# Patient Record
Sex: FEMALE | Race: WHITE
[De-identification: names, ages, dates, MRNs, and addresses within clinical notes are randomized per-mention and may not be internally consistent; named-entity substitution may affect disease eponyms.]

---

## 2019-12-14 ENCOUNTER — HOSPITAL ENCOUNTER (EMERGENCY)
Dept: HOSPITAL 7 - FB.ED | Age: 19
Discharge: HOME | End: 2019-12-14
Payer: COMMERCIAL

## 2019-12-14 DIAGNOSIS — H10.31: Primary | ICD-10-CM

## 2019-12-14 NOTE — EDM.PDOC
ED HPI GENERAL MEDICAL PROBLEM





- General


Chief Complaint: Eye Problems


Stated Complaint: RT EYE PAIN


Time Seen by Provider: 12/14/19 14:55


Source of Information: Reports: Patient


History Limitations: Reports: No Limitations





- History of Present Illness


INITIAL COMMENTS - FREE TEXT/NARRATIVE: 





pt c/o right eye burning and itching sensation X 2 days, describe watery 

discharge no crusting, denies trouble with her vision or photosensitivity or 

any other associated sx or medical concerns. pt uses contact lenses and has 

been avoiding them the past 2 days.  





ED ROS GENERAL





- Review of Systems


Review Of Systems: See Below


Constitutional: Reports: No Symptoms


HEENT: Reports: Contact Lenses, Eye Discharge.  Denies: Vision Change


Respiratory: Reports: No Symptoms


Cardiovascular: Reports: No Symptoms





ED EXAM GENERAL W FULL EYE





- Physical Exam


Exam: See Below


Exam Limited By: No Limitations


General Appearance: Alert, No Apparent Distress


Eyelids: Bilateral: Normal Appearance


Conjunctiva & Sclera: Right: Injected, Left: Normal Appearance


Cornea Exam: Bilateral: Normal Appearance


Extraocular Movements: Bilateral: Intact


Pupils: Normal Accommodation


Pupillary Size: Bilateral: 3 mm


Pupillary Reaction: Bilateral: Brisk


Anterior Chamber: Bilateral: Normal Appearance


Nose: Normal Inspection


Throat/Mouth: Normal Inspection, Normal Oropharynx


Respiratory/Chest: No Respiratory Distress, Lungs Clear


Cardiovascular: Regular Rate, Rhythm





Course





- Vital Signs


Text/Narrative:: 





pt has acute conjunctivitis. gentamicin was prescribed , pt to avoid using 

contact lenses until this resolve and to follow with eye clinic in 2-3  days if 

no gradual improvement noticed.  





Departure





- Departure


Time of Disposition: 15:13


Disposition: Home, Self-Care 01


Clinical Impression: 


 Acute conjunctivitis








- Discharge Information


Referrals: 


PCP,None [Primary Care Provider] -

## 2023-10-05 ENCOUNTER — APPOINTMENT (OUTPATIENT)
Dept: CT IMAGING | Facility: CLINIC | Age: 23
End: 2023-10-05
Attending: SOCIAL WORKER
Payer: COMMERCIAL

## 2023-10-05 ENCOUNTER — HOSPITAL ENCOUNTER (EMERGENCY)
Facility: CLINIC | Age: 23
Discharge: HOME | End: 2023-10-05
Attending: SOCIAL WORKER | Admitting: SOCIAL WORKER
Payer: COMMERCIAL

## 2023-10-05 VITALS
OXYGEN SATURATION: 100 % | SYSTOLIC BLOOD PRESSURE: 105 MMHG | WEIGHT: 143 LBS | DIASTOLIC BLOOD PRESSURE: 60 MMHG | TEMPERATURE: 97.6 F | HEART RATE: 83 BPM | RESPIRATION RATE: 18 BRPM

## 2023-10-05 DIAGNOSIS — R20.0 LEFT ARM NUMBNESS: ICD-10-CM

## 2023-10-05 LAB
ANION GAP SERPL CALCULATED.3IONS-SCNC: 10 MMOL/L (ref 7–15)
ATRIAL RATE - MUSE: 87 BPM
BUN SERPL-MCNC: 12.6 MG/DL (ref 6–20)
CALCIUM SERPL-MCNC: 9.2 MG/DL (ref 8.6–10)
CHLORIDE SERPL-SCNC: 103 MMOL/L (ref 98–107)
CREAT SERPL-MCNC: 0.73 MG/DL (ref 0.51–0.95)
DEPRECATED HCO3 PLAS-SCNC: 28 MMOL/L (ref 22–29)
DIASTOLIC BLOOD PRESSURE - MUSE: NORMAL MMHG
EGFRCR SERPLBLD CKD-EPI 2021: >90 ML/MIN/1.73M2
ERYTHROCYTE [DISTWIDTH] IN BLOOD BY AUTOMATED COUNT: 12.2 % (ref 10–15)
GLUCOSE SERPL-MCNC: 96 MG/DL (ref 70–99)
HCT VFR BLD AUTO: 42.7 % (ref 35–47)
HGB BLD-MCNC: 13.6 G/DL (ref 11.7–15.7)
HOLD SPECIMEN: NORMAL
HOLD SPECIMEN: NORMAL
INTERPRETATION ECG - MUSE: NORMAL
MCH RBC QN AUTO: 30.1 PG (ref 26.5–33)
MCHC RBC AUTO-ENTMCNC: 31.9 G/DL (ref 31.5–36.5)
MCV RBC AUTO: 95 FL (ref 78–100)
P AXIS - MUSE: 70 DEGREES
PLATELET # BLD AUTO: 232 10E3/UL (ref 150–450)
POTASSIUM SERPL-SCNC: 3.8 MMOL/L (ref 3.4–5.3)
PR INTERVAL - MUSE: 134 MS
QRS DURATION - MUSE: 74 MS
QT - MUSE: 342 MS
QTC - MUSE: 411 MS
R AXIS - MUSE: 79 DEGREES
RBC # BLD AUTO: 4.52 10E6/UL (ref 3.8–5.2)
SODIUM SERPL-SCNC: 141 MMOL/L (ref 135–145)
SYSTOLIC BLOOD PRESSURE - MUSE: NORMAL MMHG
T AXIS - MUSE: 51 DEGREES
TROPONIN T SERPL HS-MCNC: <6 NG/L
VENTRICULAR RATE- MUSE: 87 BPM
WBC # BLD AUTO: 5.9 10E3/UL (ref 4–11)

## 2023-10-05 PROCEDURE — 80048 BASIC METABOLIC PNL TOTAL CA: CPT | Performed by: SOCIAL WORKER

## 2023-10-05 PROCEDURE — 85027 COMPLETE CBC AUTOMATED: CPT | Performed by: EMERGENCY MEDICINE

## 2023-10-05 PROCEDURE — 74177 CT ABD & PELVIS W/CONTRAST: CPT

## 2023-10-05 PROCEDURE — 85027 COMPLETE CBC AUTOMATED: CPT | Performed by: SOCIAL WORKER

## 2023-10-05 PROCEDURE — 99285 EMERGENCY DEPT VISIT HI MDM: CPT | Mod: 25

## 2023-10-05 PROCEDURE — 82374 ASSAY BLOOD CARBON DIOXIDE: CPT | Performed by: EMERGENCY MEDICINE

## 2023-10-05 PROCEDURE — 84484 ASSAY OF TROPONIN QUANT: CPT | Performed by: EMERGENCY MEDICINE

## 2023-10-05 PROCEDURE — 250N000011 HC RX IP 250 OP 636: Performed by: SOCIAL WORKER

## 2023-10-05 PROCEDURE — 82310 ASSAY OF CALCIUM: CPT | Performed by: EMERGENCY MEDICINE

## 2023-10-05 PROCEDURE — 250N000009 HC RX 250: Performed by: SOCIAL WORKER

## 2023-10-05 PROCEDURE — 93005 ELECTROCARDIOGRAM TRACING: CPT

## 2023-10-05 PROCEDURE — 36415 COLL VENOUS BLD VENIPUNCTURE: CPT | Performed by: EMERGENCY MEDICINE

## 2023-10-05 PROCEDURE — 70498 CT ANGIOGRAPHY NECK: CPT

## 2023-10-05 PROCEDURE — 84484 ASSAY OF TROPONIN QUANT: CPT | Performed by: SOCIAL WORKER

## 2023-10-05 RX ORDER — IOPAMIDOL 755 MG/ML
500 INJECTION, SOLUTION INTRAVASCULAR ONCE
Status: COMPLETED | OUTPATIENT
Start: 2023-10-05 | End: 2023-10-05

## 2023-10-05 RX ORDER — IOPAMIDOL 755 MG/ML
500 INJECTION, SOLUTION INTRAVASCULAR ONCE
Status: DISCONTINUED | OUTPATIENT
Start: 2023-10-05 | End: 2023-10-05 | Stop reason: HOSPADM

## 2023-10-05 RX ADMIN — SODIUM CHLORIDE 60 ML: 9 INJECTION, SOLUTION INTRAVENOUS at 10:57

## 2023-10-05 RX ADMIN — IOPAMIDOL 72 ML: 755 INJECTION, SOLUTION INTRAVENOUS at 10:57

## 2023-10-05 ASSESSMENT — ACTIVITIES OF DAILY LIVING (ADL)
ADLS_ACUITY_SCORE: 35
ADLS_ACUITY_SCORE: 35
ADLS_ACUITY_SCORE: 33

## 2023-10-05 NOTE — Clinical Note
Bubba Do was seen and treated in our emergency department on 10/5/2023.  She may return to work on 10/07/2023.       If you have any questions or concerns, please don't hesitate to call.      Chhaya Bob MD

## 2023-10-05 NOTE — ED TRIAGE NOTES
C/O Chest pain that began yesterday aprox 1645. Pt reports sharp pain after yawning then felt pressure on chest and decreased sensation 1930. Also c/o intermittent SOB and HA. ABC in tact. A/OX4

## 2023-10-05 NOTE — DISCHARGE INSTRUCTIONS
You were seen in the emergency department for chest pain as well as left arm numbness.  Your exam was reassuring, we do not see signs of an acute emergency at this time.  The CT scan did not show any signs of an acute emergency either.    Please follow-up with your primary care doctor by giving them a call to schedule an appointment.    I am also giving you follow-up information for the neurology clinic.  Please give them a call if you continue have the numbness in your arm.    If you start having new weakness, if you notice that the sensation is changing up your arm, if you have slurred speech, facial droop, inability to walk, or if you have return of your chest pain that does not go away, please come back to the emergency [facility].

## 2023-10-06 NOTE — ED PROVIDER NOTES
History     Chief Complaint:  Chest Pain       HPI   Bubba Do is a 22 year old female without significant PMH who presented to the emergency department with chief complaint of chest pain and left arm numbness. Pt reports that yesterday she was driving home from work, yawned, and felt onset of chest pain and left arm numbness. Today was at work and felt onset of chest pain again, thus called RN line and was told to present to the ED. No recent illnesses, no fever, no cough. No history of blood clots, lower extremity swelling, not on any estrogen pills. No FH connective tissue disorders or sudden death before age 50. Last saw chiropractor two weeks ago.     Independent Historian:   None - Patient Only    Review of External Notes:   Reviewed office note from 11/7/2022      Medications:    No current outpatient medications on file.      Past Medical History:    No past medical history on file.    Past Surgical History:    No past surgical history on file.     Physical Exam   Patient Vitals for the past 24 hrs:   BP Temp Temp src Pulse Resp SpO2 Weight   10/05/23 1300 105/60 -- -- 83 -- 100 % --   10/05/23 1222 -- -- -- -- -- 99 % --   10/05/23 1220 105/71 -- -- 71 -- -- --   10/05/23 1217 -- -- -- -- -- 95 % --   10/05/23 1215 -- -- -- -- -- 98 % --   10/05/23 1126 -- -- -- -- -- 100 % --   10/05/23 1056 98/77 -- -- -- -- 100 % --   10/05/23 1018 -- -- -- -- -- 98 % --   10/05/23 1013 -- -- -- -- -- 99 % --   10/05/23 0958 108/80 -- -- -- -- 100 % --   10/05/23 0943 -- -- -- -- -- 100 % --   10/05/23 0928 103/79 -- -- -- -- 100 % --   10/05/23 0823 124/71 97.6  F (36.4  C) Temporal 91 18 99 % 64.9 kg (143 lb)        Physical Exam  General: Overall stable and nontoxic appearing  HEENT: Conjunctivae clear, no scleral icterus, mucous membranes moist  Neuro: Alert, moving all extremities equally with intention, no facial droop, no slurred speech, normal sensation throughout except as below, gait intact, strength  equal blt UE and LE   Left UE:    Diminished sensation in the 4/5th fingers of the right hand on palmar and dorsal side up tot he wrist  Normal sensation over the thumb-3rd fingers on the dorsum and palmar side of the wrist  Normal sensation in all distributions above the wrist  OK sign intact  Resistance to finger adduction intact  No wrist drop    CV: Regular rate and rhythm, radial pulses equal   Respiratory: No signs of respiratory distress, lungs clear to auscultation bilaterally   Abdomen: Soft, without rigidity or rebound throughout  MSK:    Emergency Department Course   ECG  ECG taken at 0824, ECG read at 0830  Sinus rhythm, nonsichemic   No prior  Rate 87 bpm. ND interval 134 ms. QRS duration 74 ms. QT/QTc 342/411 ms. P-R-T axes 70 79 51.     Imaging:  CT Aortic Survey w Contrast   Final Result   IMPRESSION:   1.  No aortic aneurysm or dissection. No acute findings in the chest,   abdomen and pelvis.      CAMI RYAN MD            SYSTEM ID:  [id number]      CTA Neck with Contrast   Final Result   IMPRESSION: Normal CTA of the neck.         TENA GALAVIZ MD            SYSTEM ID:  [id number]         Report per radiology    Laboratory:  Labs Ordered and Resulted from Time of ED Arrival to Time of ED Departure   CBC WITH PLATELETS - Normal       Result Value    WBC Count 5.9      RBC Count 4.52      Hemoglobin 13.6      Hematocrit 42.7      MCV 95      MCH 30.1      MCHC 31.9      RDW 12.2      Platelet Count 232     BASIC METABOLIC PANEL - Normal    Sodium 141      Potassium 3.8      Chloride 103      Carbon Dioxide (CO2) 28      Anion Gap 10      Urea Nitrogen 12.6      Creatinine 0.73      GFR Estimate >90      Calcium 9.2      Glucose 96     TROPONIN T, HIGH SENSITIVITY - Normal    Troponin T, High Sensitivity <6          Procedures       Emergency Department Course & Assessments:             Interventions:  Medications   CT Saline Flush (60 mLs Intravenous $Given 10/5/23 1050)   iopamidol (ISOVUE-370)  solution 500 mL (72 mLs Intravenous $Given 10/5/23 1051)        Assessments:  1015: I assessed and examined patient as above     Independent Interpretation (X-rays, CTs, rhythm strip):  None    Consultations/Discussion of Management or Tests:  None        Social Determinants of Health affecting care:   None    Disposition:  The patient was discharged to home.     Impression & Plan    CMS Diagnoses: None    Medical Decision MakinyF without signifcant PMH who presented to the ED with chief complaint of chest pain and left upper arm numbness. On exam overall stable and nontoxic appearing. Patient denied headache and neck pain in the room although per triage note, did mention some headache. Troponin was negative and given her symptoms first occurred >24 hrs ago, less likely ACS. EKG nonischemic. Patient PERC negative. No left upper extremity swelling or redness compared to the right upper extremity, pulses equal. Did have sensory deficit in an ulnar distribution nerve distribution, no overt weakness.  CTA neck and CT aortic survey were obtained which showed no vascular abnormalities. I did not think her symptoms were consistent with a stroke, to have isolated sensory changes in ulnar distribution of upper extremity with chest pain  in time and no other cortical signs such as slurred speech, vision changes, unilateral weakness or gait issues.  I discussed with her that workup today had not shown any acute findings, discussed that she should return for concerning symptoms specifically increased sensory deficits, weakness of her hand or arm, inability to walk, slurred speech or difficulty swallowing, fever, swelling of her arm, chest pain symptoms concerning for ACS. Patient verbalized understanding and was comfortable with the plan. Discharged in stable condtion.     Diagnosis:    ICD-10-CM    1. Left arm numbness  R20.0            Discharge Medications:  There are no discharge medications for this  patient.         Chhaya Plascencia MD  10/5/2023   No att. providers found        Chhaya Bob MD  10/05/23 [id number]